# Patient Record
(demographics unavailable — no encounter records)

---

## 2024-11-07 NOTE — HISTORY OF PRESENT ILLNESS
[FreeTextEntry1] : 77 yo F w/ hx of vertigo, GERD, sinusitis, and pulmonary HTN presents for follow up.  Feels well at this time.  No SOB, chest pain, REN, orthopnea, PND. She is active at the gym. Follows generally heart healthy diet. Does not use NSAIDs.  ================================== Prior history  Patient states that she has been having SOB when she climbs up a flight of stairs which has been going on for the last two years. She does not take the subway stairs but does have to walk up a flight of stairs to get to her apartment. When she is carrying groceries up the stairs she feels out of breath. She walks about 8000s steps a day, during walking she does not experience SOB. Symptoms started a year ago and it has not gotten worse or better.   Surgeries:  in 20s   Allergies: Aspirin because of GERD, Sulfur drugs (does not remember what happens)   No chest pain, SOB, palpitations, nausea, vomiting, PND, or orthopnea reported. When she gets up too fast, she states feeling lightheaded which she attributes to vertigo.   Of note, patient is on a low sodium diet because of elevated in office BP readings 3 years ago. She is not on BP medications. BP readings at home are taken twice a day ranging from 130s-140s/70s/80s mm Hg. She states that she went on the cruise about 1.5 months ago and has since had high blood pressure. Prior to the last two months she states her BP has been low.   Do you have polycystic ovarian syndrome? No  Have you ever been pregnant? If so, how many times? yes, 1 Did you have any complications during pregnancy? Bleeding during pregnancy, baby was born prematurely,  done, baby did not survive  Did you have any postpartum complications? No  Have you had any miscarriages? If so, how many? No  Have you gone through menopause? If yes, at what age? Early 50s  Did you receive hormone replacement? No   LIFESTYLE HISTORY:  Mediterranean Diet Score (9 question survey) was 6 (near optimal).  Self-described diet: chicken, pork, fish seafood, nuts, unsalted chips, vegetables, fruits   Exercise: Patient reports exercising at a moderate level for >150 minutes per week.  Smoking: Never smoker  EtOH: no alcohol  Illicit drug use: none  Stress: Works at a bank, has increased stress  Sleep apnea: not sure if she snores   Family Hx: No family hx of early heart disease   =============================== RADIOLOGY/IMAGING/DIAGNOSTIC TESTING: Dec 2023 TTE - nl LV & RV size & fx, mild-mod TR, trace pericardial effusion   -ECHO (2019):  nl LV size and fx, RV moderately dilated, aortic valve mildly thickened, mild mitral regurgitation, moderate to severe tricuspid regurgitation, mild pulmonary HTN 42.7 mm Hg, no pericardial effusion, EF 60%  -EKG (10/23/23): Left atrial enlargement, NSR   -Stress ECHO (2021): Normal exercise stress echocardiogram at a lower heart rate since images were obtained after pulmonary HTN protocol, abnormal test, EKG evidence of exercise ischemia, physiologic BP response to exercise, good functional capacity, mean pulm arterial pressure before exercise: 20 mm Hg and post exercise: 48 mm Hg.

## 2024-11-07 NOTE — DISCUSSION/SUMMARY
[FreeTextEntry1] : 77 yo F w/ hx of vertigo, GERD, sinusitis, and pulmonary HTN presents for follow up.  #Elevated BP Reports home BP readings are generally <120/80, she checks twice a day. BP above goal in office.  - will refer for ABPM (if it is challenging for her to travel from [place] to [place] for this, will refer to Dr. Jones)  #pHTN Following with Dr. Huggins. last TTE in Dec 2023 showing normal RV, mild-mod TR.  - TTE ordered  #ASCVD Risk reduction - lipids today - encouraged patient to continue healthy exercise and eating habits, focusing on a mostly plant based diet, Mediterranean style of eating and aiming for the recommended 150 minutes per week of moderate physical activity  RTC 6 months [EKG obtained to assist in diagnosis and management of assessed problem(s)] : EKG obtained to assist in diagnosis and management of assessed problem(s)

## 2024-11-07 NOTE — PHYSICAL EXAM
[Well Developed] : well developed [Well Nourished] : well nourished [No Acute Distress] : no acute distress [Normal Conjunctiva] : normal conjunctiva [Normal Venous Pressure] : normal venous pressure [Normal S1, S2] : normal S1, S2 [Clear Lung Fields] : clear lung fields [Good Air Entry] : good air entry [No Respiratory Distress] : no respiratory distress  [Soft] : abdomen soft [Non Tender] : non-tender [No Masses/organomegaly] : no masses/organomegaly [Normal Bowel Sounds] : normal bowel sounds [Normal Gait] : normal gait [No Edema] : no edema [No Cyanosis] : no cyanosis [No Rash] : no rash [Moves all extremities] : moves all extremities [Alert and Oriented] : alert and oriented

## 2024-11-07 NOTE — DISCUSSION/SUMMARY
[FreeTextEntry1] : 75 yo F w/ hx of vertigo, GERD, sinusitis, and pulmonary HTN presents for follow up.  #Elevated BP Reports home BP readings are generally <120/80, she checks twice a day. BP above goal in office.  - will refer for ABPM (if it is challenging for her to travel from [place] to [place] for this, will refer to Dr. Jones)  #pHTN Following with Dr. Huggins. last TTE in Dec 2023 showing normal RV, mild-mod TR.  - TTE ordered  #ASCVD Risk reduction - lipids today - encouraged patient to continue healthy exercise and eating habits, focusing on a mostly plant based diet, Mediterranean style of eating and aiming for the recommended 150 minutes per week of moderate physical activity  RTC 6 months [EKG obtained to assist in diagnosis and management of assessed problem(s)] : EKG obtained to assist in diagnosis and management of assessed problem(s)

## 2024-11-07 NOTE — REASON FOR VISIT
[CV Risk Factors and Non-Cardiac Disease] : CV risk factors and non-cardiac disease [Structural Heart and Valve Disease] : structural heart and valve disease [FreeTextEntry3] : Dr Miguel Angel Gray

## 2024-11-08 NOTE — END OF VISIT
[] : Fellow [FreeTextEntry3] : Reviewed medications, outpatient notes, stress echo. Symptoms remain minimal, NYHA FC I, excellent exercise tolerance. Stress echo with mild increase in PASP which may be expected given age and known htn. Has apical scarring which we are following with yearly PFT and 6mwt. No need for further work up of mild PH w/ exertion, due for PFT and 6MWT, can be obtained at 6 month follow up given symptom stability.  [Time Spent: ___ minutes] : I have spent [unfilled] minutes of time on the encounter which excludes teaching and separately reported services.

## 2024-11-13 NOTE — HISTORY OF PRESENT ILLNESS
[Never] : never [TextBox_4] : Pt is 77 yo F with vertigo, GERD, sinusitis.  Pt referred by Dr. Jay Luong for evaluation of pulmonary hypertension after having intermittent chest pain and  ECHO 12/13/17 demonstrating RVSP 40 mmHg. Pt denies h/o  blood clots.  She can walk as much as she'd like on a flat surface.  She may have mild REN going up stairs if she goes quickly, can climb three flights at a normal pace with no difficulty.  She works out in gym 3 times per week, weights, walks on treadmill for 30 minutes up to 4.4mi/hr, up to 2.0 elevation. Over the years TTEs have been relatively stable at low 40's PASP.  On stress echo done 7/2020, she had borderline exercise-induced pulmonary hypertension with her mean pulmonary artery pressure (mPAP) rising to approximately 40 mmHg post exercise. Her PVR post-exercise was also borderline high at 3.1 HA. However, it must be pointed out that her TAPSE was excellent at 25 mm and the S' was normal at 14 cm/s (normal). Functionally, we performed a 6MWT; she covered 512 meters and showed no desaturation. Repeat stress echo done 7/9/21 showed a normal TAPSE, 30 mm. Her PASP went from 29mmHg to 48 mmHg post exercise. Mean PASP went from 20mmHg to 31mmHg. The pulmonary vascular resistance post-exercise was 2.06 Wood Units. Grade II diastolic dysfunction was observed. Given her lack of symptoms and excellent walk distance (>500m), no further testing pursued, here for follow up. Also noted to have biapical scarring, has been stable on subsequent CT chest imaging. Repeating PFT's yearly while pt asymptomatic.   11-13-24 Pt is feeling well.  Measures BP at home BID. Last night 113/65 in am .  When she has a lot of anxiety or if she is rushing her BP will be high. Today had a lot of traffic getting her and was nervous about being late so BP before 6MWT was elevated. Dr. Harrison ordered an overnight BP monitor that will be done in December. That will determine if she needs BP medication. Dr. Harrison also did an echo.  Follows a low salt and no red meat diet. Her LDL is elevated so will be cutting back on egg yolk Walks at least 30 minutes 2 days a week and her non gym days she walks at least 8,000 steps. Works out with a  2 days a week. On Prolia for bone health. Has not noticed any peripheral edema. No shortness of breath.

## 2024-11-13 NOTE — PROCEDURE
[FreeTextEntry1] : 11/13/24 PFT and 6MWT  FVC 1.70 (71) FEV1 1.54 (86) FEV1/FVC 90 FEF 25-75% 2.06 (143) TLC 2.94 (66) VC 1.70 (70) DLCO 13.14 (71)  Waked 502 meters Resting SpO2 98% on room air HR 74 /87 End test SpO2 98% on room air  /101 Sriram 0 **** 11/7/24 ECHO 1. Left ventricular cavity is small. Left ventricular wall thickness is normal. Left ventricular systolic function is normal with an ejection fraction of 74 % by To's method of disks. There are no regional wall motion abnormalities seen.  2. Normal left ventricular diastolic function.  3. Normal right ventricular cavity size, with normal wall thickness, and normal right ventricular systolic function.  4. Normal left and right atrial size.  5. Moderate tricuspid regurgitation.  6. Prolapse of both mitral leaflets.  7. Mild mitral regurgitation.  8. Estimated pulmonary artery systolic pressure is 36 mmHg.  9. No pericardial effusion seen. ***** 10/23/23 ECHO 1. Left ventricular wall thickness is normal. Left ventricular systolic function is normal with an ejection fraction of 69 % by To's method of disks. 2. Normal left ventricular diastolic function. 3. Normal right ventricular cavity size, wall thickness, and systolic function. 4. Mild to moderate tricuspid regurgitation. 5. Estimated pulmonary artery systolic pressure is 32 mmHg. 6. Trace pericardial effusion. ***** 8/28/23 CXR impression: Hyperinflation Redemonstration biapical scarring. No acute focal opacity. Heart size within normal limits, thoracic aortic calcification. Stable bony structures ***** 8/28/23 ECHO 1. Normal left ventricular size and systolic function.  2. Dilated right ventricular size.  3. Normal right ventricular systolic function.  4. Normal atria.  5. Bileaflet mitral valve prolpase. Mild mitral regurgitation.  6. Moderate tricuspid regurgitation.  7. Aortic sclerosis without significant stenosis.  8. No evidence of pulmonary hypertension.  9. No pericardial effusion. 10. Compared to the previous TTE performed on 7/9/2021, there have been no significant interval changes. ***** 7/19/23 PFT and 6MWT FVC 1.80 (73) FEV1 1.59 (87) FEV1/FVC 88 FEF 25-75% 1.91 (128) TLC 3.04 (68) VC 1.80 (73) RV/TLC 40.67 (91) DLCO 15.03 (80)  6MWT 493 meters Resting SpO2 100% on room air HR 87 End test SpO2 100% on room air HR 99 Sriram 0 ***** 7/20/22  PFT and 6MWT FVC 1.84 (74) FEV1 (1.60 (86) FEV1/FVC 87 FEF 25-75% 2.18 (142) TLC 3.4 (77) VC 1.84 (74) RV/TLC  45.83 (104) DLCO 12.66 (67)  Normal FEV1/FVC ratio Lung capacity mildly decreased Diffusion cpapcity is mildly decreased  6MWT 531 meters Resting SpO2 98% on RA  HR 81 /89 End test SpO2 97% on RA  /112 Sriram SOB 0 Sriram fatigue 0 none ***** 9/21/21 CT CHEST PE PROTOCOL  No evidence of PE. Large airways disease noted similar to the prior study. No pericardial effusion. Narrow AP diameter of chest cage ***** Stress echo 7/9/21 CONCLUSIONS:   1. Normal exercise stress echocardiogram at a loer heart rate since images were obtained after pulmonary  HTN protocol image acquisition.   2. Abnormal exercise electrocardiography.   3. Positive ECG evidence of exercise ischemia at or near maximal predicted heart rate.   4. Physiologic blood pressure response to exercise.   5. Good functional capacity.   6. Pulmonary Hypertension:   Pre-exercise:   The right ventricular size and systolic function was normal. The tricuspid annular plane systolic excursion  (TAPSE) pre-exercise is 30.00 mm (normal >=17 mm). RV tissue Doppler S' pre-exercise was 16.00 cm/s  (normal >10 cm/s). The right atrium is normal in size. The left atrium is normal in size. Grade II diastolic  dysfunction was observed. The peak artery systolic pressure pre-exercise was 29 mmHg. The mean artery systolic pressure pre-exercise was 20 mmHg. The cardiac output pre-exercise was 4.0 l/min. The pulmonary vascular resistance pre-exercise was 2.16 Wood Units. Post-exercise:   The right ventricular size remained normal and systolic function was increased. The tricuspid annular  plane systolic excursion (TAPSE) post-exercise is 31.00 mm (normal >=17 mm). RV tissue Doppler S' postexercise was 20.00 cm/s (normal >10 cm/s). Grade II diastolic dysfunction was observed. The peak artery systolic pressure post-exercise was 48 mmHg. The cardiac output post-exercise was 8.7 l/min. The pulmonary vascular resistance post-exercise was 2.06 Wood Units. Stress echo 7/17/20:  1. Pre Exercise:   2. The right ventricular appears dilated with normal systolic function. The tricuspid annular plane syst excursion (TAPSE) pre-exercise is 24.00 mm (normal >=17 mm). RV tissue Doppler S' pre-exercise was 1 cm/s (normal >10 cm/s). The right atrium is normal in size. The peak artery systolic pressure pre-exer was 38 mmHg. The mean artery systolic pressure pre-exercise was 25 mmHg. The cardiac output  exercise was 3.3 l/min. The pulmonary vascular resistance pre-exercise was 2.10 Wood Units.  ***** Home sleep study 11/25/19: AHI 1.1, min SpO1 88% ***** EXAM: ECHOCARDIOGRAM (CARDIOL) PROCEDURE DATE: 11/25/2019  1. The left ventricle is normal in size, wall thickness, and systolic function with a calculated ejection fraction of 60%.   2. The right ventricle is moderately dilated. Normal RV systolic function.   3. The aortic valve is mildly thickened.   4. The mitral valve is mildly thickened. There is mild mitral regurgitation.   5. There is moderate-to-severe tricuspid regurgitation.   6. There is mild pulmonary hypertension. Pulmonary artery systolic pressure (estimated using the tricuspid regurgitant gradient and an estimate of right atrial pressure) is 42.7 mmHg.   7. No pericardial effusion is seen.  Structurally normal tricuspid valve with normal leaflet excursion. There is moderate-to-severe tricuspid regurgitation. There is mild pulmonary hypertension. Pulmonary artery systolic pressure (estimated using the tricuspid regurgitant gradient and an estimate of right atrial pressure) is 42.7 mmHg.  ***** ECHO 5/31/18 done with Dr. Cole Dalal, PASP 41 mmHg.  ***** Blood tests 2/15/18: JN: 1:160, homogeneous CH50: 39 RF: WNL < 7.0 D-dimer:

## 2024-11-13 NOTE — ASSESSMENT
[FreeTextEntry1] : 74yo F with mild PH on TTE found after intermittent chest pain in 2017, referred to PH clinic and found to have mild exercise induced PH on stress echo presents for follow up.  1. Exercise induced pulmonary hypertension, mild- 2/2 diastolic dysfunction and HTN, remains NYHA FC I. The elevation of PASP w/ exercise may be within normal range for her age accepting some error in echo measurement. Tolerating 8000 steps/day, no limitations, goes to gym 2-3 times weekly with completes resistance training and additional 30 mins treadmill brisk walk sometimes with elevation, remains asymptomatic.  Repeat PFT and 6MWT stable, no limitations, persistent mild restriction. TTE 11/2024 without evidence of PH despite mod TR. Normal rv size/fxn.   Plan: - Continue to exercise regularly and ensure there is no reduction in her exercise capacity - No plan for RHC/additional workup for now, will revisit if there is a change in function/symptoms/echocardiogram - Annual PFT/6MWt and ntprobnp  2. 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 She has not received prophylaxis in the past. There is biapical scarring and focal opacities prominent in the upper lung zones on her CT of the chest from 11/27/18. She is asymptomatic, without any systemic or respiratory evidence of infection. CT imaging has remained stable, likely old scarring. Has some mucus impaction in RML/lingula suspicious for LILO, but given her lack of cough or respiratory symptoms would not warrant treatment - continue to monitor for and call us regarding symptoms such as cough lasting > 2 weeks, fever, night sweats, hemoptysis, or unintentional weight loss - no plan for repeat CT currently, will obtain with change in PFT/symptoms as outlined above -PFTs yearly unless patient develops symptoms, mild restriction unchanged from last year  3. Mild obstructive airways  On prior PFT APP21-32% showed significant improvement of inhaled bronchodilator, but prior jatinder demonstrated a normal FEV1/FVC 87%. We will continue to monitor her symptoms and periodic spirometries. No inhaled medications is required at this time, especially as she has no respiratory complaints. Plan: - Continue to monitor spirometry and symptoms  Return to clinic: 1 year w/ PFT and 6MWT.

## 2024-11-13 NOTE — HISTORY OF PRESENT ILLNESS
[Never] : never [TextBox_4] : Pt is 75 yo F with vertigo, GERD, sinusitis.  Pt referred by Dr. Jay Luong for evaluation of pulmonary hypertension after having intermittent chest pain and  ECHO 12/13/17 demonstrating RVSP 40 mmHg. Pt denies h/o  blood clots.  She can walk as much as she'd like on a flat surface.  She may have mild REN going up stairs if she goes quickly, can climb three flights at a normal pace with no difficulty.  She works out in gym 3 times per week, weights, walks on treadmill for 30 minutes up to 4.4mi/hr, up to 2.0 elevation. Over the years TTEs have been relatively stable at low 40's PASP.  On stress echo done 7/2020, she had borderline exercise-induced pulmonary hypertension with her mean pulmonary artery pressure (mPAP) rising to approximately 40 mmHg post exercise. Her PVR post-exercise was also borderline high at 3.1 HA. However, it must be pointed out that her TAPSE was excellent at 25 mm and the S' was normal at 14 cm/s (normal). Functionally, we performed a 6MWT; she covered 512 meters and showed no desaturation. Repeat stress echo done 7/9/21 showed a normal TAPSE, 30 mm. Her PASP went from 29mmHg to 48 mmHg post exercise. Mean PASP went from 20mmHg to 31mmHg. The pulmonary vascular resistance post-exercise was 2.06 Wood Units. Grade II diastolic dysfunction was observed. Given her lack of symptoms and excellent walk distance (>500m), no further testing pursued, here for follow up. Also noted to have biapical scarring, has been stable on subsequent CT chest imaging. Repeating PFT's yearly while pt asymptomatic.   11-13-24 Pt is feeling well.  Measures BP at home BID. Last night 113/65 in am .  When she has a lot of anxiety or if she is rushing her BP will be high. Today had a lot of traffic getting her and was nervous about being late so BP before 6MWT was elevated. Dr. Harrison ordered an overnight BP monitor that will be done in December. That will determine if she needs BP medication. Dr. Harrison also did an echo.  Follows a low salt and no red meat diet. Her LDL is elevated so will be cutting back on egg yolk Walks at least 30 minutes 2 days a week and her non gym days she walks at least 8,000 steps. Works out with a  2 days a week. On Prolia for bone health. Has not noticed any peripheral edema. No shortness of breath.

## 2024-11-13 NOTE — PHYSICAL EXAM
[No Acute Distress] : no acute distress [Well Groomed] : well groomed [Normal Oropharynx] : normal oropharynx [Normal Appearance] : normal appearance [No Neck Mass] : no neck mass [Normal Rate/Rhythm] : normal rate/rhythm [No Resp Distress] : no resp distress [No Acc Muscle Use] : no acc muscle use [Normal Rhythm and Effort] : normal rhythm and effort [Clear to Auscultation Bilaterally] : clear to auscultation bilaterally [No Abnormalities] : no abnormalities [Benign] : benign [Normal Gait] : normal gait [No Clubbing] : no clubbing [No Cyanosis] : no cyanosis [FROM] : FROM [Normal Color/ Pigmentation] : normal color/ pigmentation [No Focal Deficits] : no focal deficits [Oriented x3] : oriented x3 [Normal Affect] : normal affect [TextBox_2] : patient is thin in build [TextBox_11] : no pallor or icterus [TextBox_44] : no JVD at 45 degrees, no hepatojugular reflux, no clinically detected hepatosplenomegaly [TextBox_54] : normal S1, S2, no murmurs, rubs, gallops, P2 not loud or split [TextBox_68] : good air entry, no wheezing, rhonchi or crackles [TextBox_80] : slightly diminished breath sounds on right lung fields [TextBox_105] : Trace pedal edema [TextBox_125] : no skin thickening over the dorsum of the hands, good radial pulses

## 2024-11-13 NOTE — ASSESSMENT
[FreeTextEntry1] : 76yo F with mild PH on TTE found after intermittent chest pain in 2017, referred to PH clinic and found to have mild exercise induced PH on stress echo presents for follow up.  1. Exercise induced pulmonary hypertension, mild- 2/2 diastolic dysfunction and HTN, remains NYHA FC I. The elevation of PASP w/ exercise may be within normal range for her age accepting some error in echo measurement. Tolerating 8000 steps/day, no limitations, goes to gym 2-3 times weekly with completes resistance training and additional 30 mins treadmill brisk walk sometimes with elevation, remains asymptomatic.  Repeat PFT and 6MWT stable, no limitations, persistent mild restriction. TTE 11/2024 without evidence of PH despite mod TR. Normal rv size/fxn.   Plan: - Continue to exercise regularly and ensure there is no reduction in her exercise capacity - No plan for RHC/additional workup for now, will revisit if there is a change in function/symptoms/echocardiogram - Annual PFT/6MWt and ntprobnp  2. 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 She has not received prophylaxis in the past. There is biapical scarring and focal opacities prominent in the upper lung zones on her CT of the chest from 11/27/18. She is asymptomatic, without any systemic or respiratory evidence of infection. CT imaging has remained stable, likely old scarring. Has some mucus impaction in RML/lingula suspicious for LILO, but given her lack of cough or respiratory symptoms would not warrant treatment - continue to monitor for and call us regarding symptoms such as cough lasting > 2 weeks, fever, night sweats, hemoptysis, or unintentional weight loss - no plan for repeat CT currently, will obtain with change in PFT/symptoms as outlined above -PFTs yearly unless patient develops symptoms, mild restriction unchanged from last year  3. Mild obstructive airways  On prior PFT VDR30-94% showed significant improvement of inhaled bronchodilator, but prior jatinder demonstrated a normal FEV1/FVC 87%. We will continue to monitor her symptoms and periodic spirometries. No inhaled medications is required at this time, especially as she has no respiratory complaints. Plan: - Continue to monitor spirometry and symptoms  Return to clinic: 1 year w/ PFT and 6MWT.

## 2024-11-13 NOTE — REVIEW OF SYSTEMS
[Recent Wt Gain (___ Lbs)] : ~T recent [unfilled] lb weight gain [Postnasal Drip] : postnasal drip [Seasonal Allergies] : seasonal allergies [Negative] : Endocrine [Fever] : no fever [Chills] : no chills [Rash] : no rash [TextBox_94] : on prolia for OA, + sciatica